# Patient Record
Sex: MALE | Race: WHITE
[De-identification: names, ages, dates, MRNs, and addresses within clinical notes are randomized per-mention and may not be internally consistent; named-entity substitution may affect disease eponyms.]

---

## 2020-10-11 ENCOUNTER — HOSPITAL ENCOUNTER (OUTPATIENT)
Dept: HOSPITAL 46 - ED | Age: 45
LOS: 1 days | Discharge: HOME | End: 2020-10-12
Attending: SURGERY
Payer: COMMERCIAL

## 2020-10-11 VITALS — WEIGHT: 265 LBS | HEIGHT: 71 IN | BODY MASS INDEX: 37.1 KG/M2

## 2020-10-11 DIAGNOSIS — K21.9: ICD-10-CM

## 2020-10-11 DIAGNOSIS — Z20.828: ICD-10-CM

## 2020-10-11 DIAGNOSIS — T18.128A: Primary | ICD-10-CM
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
## 2020-10-11 PROCEDURE — C9803 HOPD COVID-19 SPEC COLLECT: HCPCS

## 2020-10-11 PROCEDURE — 0DC58ZZ EXTIRPATION OF MATTER FROM ESOPHAGUS, VIA NATURAL OR ARTIFICIAL OPENING ENDOSCOPIC: ICD-10-PCS | Performed by: SURGERY

## 2020-10-11 NOTE — NUR
10/11/20 2342 Burnett,Kaylene EMANUEL
2305: PATIENT ARRIVED TO PACU DROWSY, BUT TALKING TO NURSES. BARBARA
PAIN. MAINTAINING O2 SAT ON ROOM AIR.
2312: DR. BOSS AT BEDSIDE SPEAKING WITH PATIENT.
2330: PATIENT TRANSFERRED TO M/S ROOM 124. REPORT GIVEN TO M/S RN.

## 2020-10-11 NOTE — NUR
PT ARRIVES TO FLOOR VIA STRETCHER. ABLE TO SCOOT HIMSLEF OVER TO THE BED
INDEPENDETLY. PT DENIES PAIN, NAUSEA, SOB. PT REPORTS FEELING VERY SLEEPY. VS
OBTAINED, WNL. PT PROVIDED WITH ICE WATER AND JELLO. WILL ATTEMPT TO EAT AND
DRINK SMALL AMOUNT. DENIES FURTHER NEEDS AT THIS TIME. CALL LIGHT IN REACH.

## 2020-10-12 NOTE — CONS
[facility]
                                    [address], [place]  [address]
_________________________________________________________________________________________
                                                                 Signed   
 
 
DATE OF CONSULTATION:
10/11/2020
 
CHIEF COMPLAINT:
Esophageal foreign body.
 
HISTORY OF PRESENT ILLNESS:
Joi is a 45-year-old obese gentleman, who complains of chicken being stuck in his
proximal esophagus.  He was eating around 6:30 tonight when he felt the chicken got
stuck.  He said it has happened many times over many years.  He thinks he has acid
reflux, but apparently he does not take anything for it.  He has no primary care
provider.  When this would not clear, he decided to come to the emergency room for
evaluation.  His wife is with him today.  I was asked to see him in the emergency room
by the ER physician. 
 
PAST MEDICAL HISTORY:
Gastroesophageal reflux disease and obesity.
 
PAST SURGICAL HISTORY:
None.
 
SOCIAL HISTORY:
He does not smoke or drink.  He is  and has no primary care provider.
 
FAMILY HISTORY:
None.
 
REVIEW OF SYSTEMS:
He had 10 systems reviewed and he discussed his acid reflux.
 
ALLERGIES:
None.
 
MEDICATIONS:
None.
 
PHYSICAL EXAMINATION:
VITAL SIGNS:  Blood pressure 161/98, heart rate 65, respiratory rate 18, temperature is
98.2.  He is 99% on room air.  He is 5 feet 11 inches at 120 kg. 
GENERAL:  Joi is a 45-year-old gentleman, sitting upright in his ER bed with his wife
at the bedside.  He has a spit bag in his hand.  He points toward the sternal notch area
as the location of the blockage. 
LUNGS:  Clear to auscultation bilaterally. 
HEART:  Regular rate and rhythm. 
 
    Electronically Signed By: ARIS SHINE MD  10/12/20 1131
_________________________________________________________________________________________
PATIENT NAME:     JOI AYON                 
MEDICAL RECORD #: [id number]            CONSULTATION                  
          ACCT #: [id number]  
DATE OF BIRTH:   [birth year]            REPORT #: [id number]      
PHYSICIAN:        ARIS SHINE MD             
PCP:              NO PRIMARY CARE PHYSICIAN     
REPORT IS CONFIDENTIAL AND NOT TO BE RELEASED WITHOUT AUTHORIZATION
 
 
                                  [facility]
                                    [address], [place]  [address]
_________________________________________________________________________________________
                                                                 Signed   
 
 
ABDOMEN:  Obese.  He talks in full sentences and controls his airway just fine.
 
LABORATORY DATA:
Labs none. 
 
X-rays none.
 
ASSESSMENT AND PLAN:
Joi is a 45-year-old gentleman, who appears to have chicken stuck in his proximal
esophagus.  We have called our crew and will be taking him for endoscopy here shortly.
His wife has been through endoscopy and she does recall that quite readily.  I explained
to Joi the nature of an upper scope along with its risks including, but not limited to
gas bloating, crampy abdominal pain, bleeding, perforation requiring surgery, and missed
diagnosis.  In these situations, we always have an anesthesia provider for airway
control.  He had expressed understanding and wished to proceed. 
 
 
 
            ________________________________________
            Aris Shine MD 
 
 
ALB/MODL
Job #:  [id number]
DD:  10/11/2020 22:07:58
DT:  10/12/2020 02:16:49
 
cc:            Aris Shine MD
 
 
Copies:  ARIS SHINE MD
~
 
 
 
 
 
 
 
 
 
 
 
 
 
    Electronically Signed By: ARIS SHINE MD  10/12/20 1131
_________________________________________________________________________________________
PATIENT NAME:     JOI AYON                 
MEDICAL RECORD #: [id number]            CONSULTATION                  
          ACCT #: [id number]  
DATE OF BIRTH:   [birth year]            REPORT #: [id number]      
PHYSICIAN:        ARIS SHINE MD             
PCP:              NO PRIMARY CARE PHYSICIAN     
REPORT IS CONFIDENTIAL AND NOT TO BE RELEASED WITHOUT AUTHORIZATION

## 2020-10-12 NOTE — OR
[facility]
                                    [address], [place]  [address]
_________________________________________________________________________________________
                                                                 Signed   
 
 
DATE OF OPERATION:
10/11/2020
 
SURGEON:
Aris Shine MD
 
PREOPERATIVE DIAGNOSIS:
Esophageal foreign body (chicken).
 
POSTOPERATIVE DIAGNOSIS:
Esophageal foreign body (chicken).
 
PROCEDURE:
EGD with foreign body removal.
 
ESTIMATED BLOOD LOSS:
None.
 
INDICATIONS:
Joi is a 45-year-old gentleman, who works for [employer] here in [place], [place].  He appears to have some of his training through the .  For several
years, he has had trouble with food getting stuck in his esophagus.  They were eating
chicken earlier tonight and he felt a piece get stuck.  He is quite confident it is up
near the sternal notch.  He came to emergency room for evaluation.  I was asked to see
him in the emergency room by our ER physician.  I met with Joi and his wife and
explained to them we would need to do upper endoscopy to remove the foreign body.  His
wife explained she has been through upper endoscopy and she understands it quite well.
I explained to Joi we need an anesthesia provider to control the airway.  He
understands there is risk including, but not limited to gas bloating, crampy abdominal
pain, bleeding, perforation requiring surgery, and missed diagnosis.  He expressed
understanding and wished to proceed. 
 
PROCEDURE NOTE:
Joi was taken into our endoscopy suite and placed in a supine semi-recumbent position
under general endotracheal tube anesthesia.  The adult gastroscope was introduced slowly
under direct visualization of camera.  We suctioned out the column of saliva in front of
us until we reached the lower esophageal sphincter and the chicken.  We took multiple
bites of the chicken and eventually with gentle pressure, the chicken passed through the
lower esophageal sphincter and into the stomach.  The stomach had quite a bit of chicken
and food particles in it.  From what we could tell, the mucosa was fine.  Upon
retroflexion of scope, I really could not get a view of his cardia because of the food
around that area.  We withdrew the scope back up through that area and if he has a
 
    Electronically Signed By: ARIS SHINE MD  10/12/20 1131
_________________________________________________________________________________________
PATIENT NAME:     JOI AYON                 
MEDICAL RECORD #: [id number]            OPERATIVE REPORT              
          ACCT #: [id number]  
DATE OF BIRTH:   [birth year]            REPORT #: [id number]      
PHYSICIAN:        ARIS SHINE MD             
PCP:              NO PRIMARY CARE PHYSICIAN     
REPORT IS CONFIDENTIAL AND NOT TO BE RELEASED WITHOUT AUTHORIZATION
 
 
                                  [facility]
                                    [address], [place]  [address]
_________________________________________________________________________________________
                                                                 Signed   
 
 
hiatal hernia, it would be quite small.  There was a little irritation along the Z-line
in his distal esophagus from the chicken, but otherwise no significant findings.  The
middle and upper esophagus were not particularly concerning.  After this, the gas was
suctioned out and the gastroscope removed.  Joi tolerated the procedure quite well. 
 
RECOMMENDATIONS:
Joi will be discharged to home once he recovers from his anesthesia.  I will see him in 
the office in 7 to 10 days to review the results.  He should strongly consider a barium
swallow.  He also needs to establish with a primary care provider. 
 
 
 
            ________________________________________
            Aris Shine MD 
 
 
ALB/MODL
Job #:  [id number]
DD:  10/11/2020 23:06:36
DT:  10/12/2020 01:54:39
 
cc:            Aris Shine MD
 
 
Copies:  ARIS SHINE MD
~
 
 
 
 
 
 
 
 
 
 
 
 
 
 
 
 
 
    Electronically Signed By: ARIS SHINE MD  10/12/20 1131
_________________________________________________________________________________________
PATIENT NAME:     JOI AYON                 
MEDICAL RECORD #: [id number]            OPERATIVE REPORT              
          ACCT #: [id number]  
DATE OF BIRTH:   [birth year]            REPORT #: [id number]      
PHYSICIAN:        ARIS SHINE MD             
PCP:              NO PRIMARY CARE PHYSICIAN     
REPORT IS CONFIDENTIAL AND NOT TO BE RELEASED WITHOUT AUTHORIZATION

## 2020-10-12 NOTE — PATH
[facility]
                                    [address], [place]  [address]
_________________________________________________________________________________________
                                                                 Signed   
 
 
 
ORDERING PHYSICIAN:
Ronnell Gibbs MD
PATIENT NAME:
JOI AYON
GENDER:  M     :  1975
 
Prior History:
No cases found.
 
SPECIMEN(S): No Source Given
 
MOLECULAR PATHOLOGY RESULTS:
SARS-CoV-2     Not Detected
 
ADDITIONAL NOTES.:
The [employer] Fusion SARS-CoV-2 Assay is a multiplex real-time PCR (RT-PCR) in 
vitro diagnostic test intended for the qualitative detection of RNA from 
SARS-CoV-2 from individuals who meet COVID-19 
clinical and/or epidemiological criteria. In general, SARS-CoV-2 RNA can be 
detected during the acute phase of infection. 
Positive results indicate the presence of SARS-CoV-2 RNA.  Clinical correlation 
with patient history and other diagnostic information is necessary to determine 
patient infection status. Positive 
results do not rule out bacterial infection or co-infection with other viruses.
Negative results do not preclude SARS-CoV-2 infection and should not be used as 
the sole basis for patient management decisions. Negative results must be 
combined with other clinical observations, 
patient history, and epidemiological information.
 
The [employer] Fusion SARS-CoV-2 Assay is not yet approved or cleared by the 
[place] FDA. When there are no FDA-approved or cleared tests available, 
and other criteria are met, FDA can make tests 
available under an emergency access mechanism called an Emergency Use 
Authorization (EUA). The EUA for this test is supported by the  of 
Health and Human Service's (HHS's) declaration that 
circumstances exist to justify the emergency use of in vitro diagnostics for 
the detection and/or diagnosis of the virus that causes COVID-19. This EUA will 
remain in effect for the duration of the 
COVID-19 declaration justifying emergency of IVDs, unless it is terminated or 
revoked by FDA, after which the test may no longer be used. The [employer] Fusion 
SARS-CoV-2 Assay is for use only under EUA 
 
                                                                                    
_________________________________________________________________________________________
PATIENT NAME:     JOI AYON                 
MEDICAL RECORD #: [id number]            PATHOLOGY                     
          ACCT #: [id number]       ACCESSION #: [id number]     
DATE OF BIRTH:   [birth year]            REPORT #: [id number]       
PHYSICIAN:        LILIANE PATHOLOGY              
PCP:              NO PRIMARY CARE PHYSICIAN     
REPORT IS CONFIDENTIAL AND NOT TO BE RELEASED WITHOUT AUTHORIZATION
 
 
                                  [facility]
                                    [address] [place] [address]  [address]
_________________________________________________________________________________________
                                                                 Signed   
 
 
in [place] laboratories certified under the Clinical Laboratory Improvement 
Amendments of 1988 (CLIA) to perform high complexity tests. [facility] 
is certified under CLIA to perform high complexity 
 
clinical laboratory testing.
 
PERFORMING LABORATORY.:
Molecular testing was performed by [facility] [place] [address] (Medical Director:  Scar Mace D.O.; CLIA#:  
[id number]) 
 
Diagnostician:  ***System Interface***
Pathologist
Electronically Signed 10/12/2020
 
 
Copies:                                
~
 
 
 
 
 
 
 
 
 
 
 
 
 
 
 
 
 
 
 
 
 
 
 
 
 
                                                                                    
_________________________________________________________________________________________
PATIENT NAME:     JOI AYON                 
MEDICAL RECORD #: [id number]            PATHOLOGY                     
          ACCT #: [id number]       ACCESSION #: [id number]     
DATE OF BIRTH:   [birth year]            REPORT #: [id number]       
PHYSICIAN:        LILIANE GEORGES              
PCP:              NO PRIMARY CARE PHYSICIAN     
REPORT IS CONFIDENTIAL AND NOT TO BE RELEASED WITHOUT AUTHORIZATION

## 2020-10-12 NOTE — NUR
WRITER TO ROOM TO CHECK ON PT. PT FULLY DRESSED. STATES THAT HE IS READY TO
GO. PT HAS URINAL PRESENT AT BEDSIDE WITH 200 ML OF YELLOW URINE PRESENT. PT
DENIES PAIN, NAUSEA, OR SOB. STATES HE "FEELS GREAT". IV SITE DC'D, WNL. DC
INSTRUCTIONS, PRINTED AND VERBAL, PROVIDED. PT AND HIS WIFE DENY QUESTIONS,
VERBALIZE UNDERSTANDING. PT ABLE TO AMBULATE OUT INDEPENDENTLY.

## 2021-08-05 ENCOUNTER — HOSPITAL ENCOUNTER (EMERGENCY)
Dept: HOSPITAL 46 - ED | Age: 46
Discharge: HOME | End: 2021-08-05
Payer: COMMERCIAL

## 2021-08-05 VITALS — BODY MASS INDEX: 37.94 KG/M2 | WEIGHT: 265 LBS | HEIGHT: 70 IN

## 2021-08-05 DIAGNOSIS — T18.128A: Primary | ICD-10-CM
